# Patient Record
Sex: FEMALE | Race: WHITE | NOT HISPANIC OR LATINO | URBAN - METROPOLITAN AREA
[De-identification: names, ages, dates, MRNs, and addresses within clinical notes are randomized per-mention and may not be internally consistent; named-entity substitution may affect disease eponyms.]

---

## 2021-06-10 ENCOUNTER — HOSPITAL ENCOUNTER (EMERGENCY)
Facility: HOSPITAL | Age: 51
Discharge: HOME | End: 2021-06-10
Attending: EMERGENCY MEDICINE | Admitting: EMERGENCY MEDICINE

## 2021-06-10 VITALS
OXYGEN SATURATION: 97 % | DIASTOLIC BLOOD PRESSURE: 80 MMHG | RESPIRATION RATE: 17 BRPM | HEART RATE: 91 BPM | TEMPERATURE: 98.4 F | WEIGHT: 149.25 LBS | SYSTOLIC BLOOD PRESSURE: 126 MMHG

## 2021-06-10 DIAGNOSIS — S09.90XA INJURY OF HEAD, INITIAL ENCOUNTER: Primary | ICD-10-CM

## 2021-06-10 DIAGNOSIS — S00.93XA HEAD CONTUSION: ICD-10-CM

## 2021-06-10 DIAGNOSIS — B86 SCABIES: ICD-10-CM

## 2021-06-10 PROCEDURE — 90471 IMMUNIZATION ADMIN: CPT

## 2021-06-10 PROCEDURE — 90715 TDAP VACCINE 7 YRS/> IM: CPT | Performed by: PHYSICIAN ASSISTANT

## 2021-06-10 PROCEDURE — 99282 EMERGENCY DEPT VISIT SF MDM: CPT | Performed by: PHYSICIAN ASSISTANT

## 2021-06-10 PROCEDURE — 99283 EMERGENCY DEPT VISIT LOW MDM: CPT

## 2021-06-10 RX ORDER — PERMETHRIN 50 MG/G
CREAM TOPICAL
Qty: 60 G | Refills: 0 | Status: SHIPPED | OUTPATIENT
Start: 2021-06-10

## 2021-06-10 RX ORDER — BACITRACIN, NEOMYCIN, POLYMYXIN B 400; 3.5; 5 [USP'U]/G; MG/G; [USP'U]/G
1 OINTMENT TOPICAL ONCE
Status: COMPLETED | OUTPATIENT
Start: 2021-06-10 | End: 2021-06-10

## 2021-06-10 RX ADMIN — BACITRACIN ZINC, NEOMYCIN, POLYMYXIN B SULFAT 1 SMALL APPLICATION: 5000; 3.5; 4 OINTMENT TOPICAL at 15:18

## 2021-06-10 RX ADMIN — TETANUS TOXOID, REDUCED DIPHTHERIA TOXOID AND ACELLULAR PERTUSSIS VACCINE, ADSORBED 0.5 ML: 5; 2.5; 8; 8; 2.5 SUSPENSION INTRAMUSCULAR at 15:23

## 2021-06-10 NOTE — ED PROVIDER NOTES
History  Chief Complaint   Patient presents with    Fall     pt fell yesterday and hit L side of head wound noted, pt concerned for infection  also reports bug bites all over body  Patient is a 44 y/o female presenting to the ED for evaluation of head injury and rash  Pt states yesterday she tripped and fell, hitting the right frontal area of her head, sustaining abrasion  Pt denies LOC  Pt denies alcohol use or blood thinner use  Pt cleaned abrasion, but wants to make sure it is not infected  Unknown last tetanus  Patient also reports bug bite rash all over her body for past week, states occurred after washing her dog, but the dog does not have flees  Pt worried about possible scabies  Pt denies fever, new medication, new food, new soap/detergent  None       No past medical history on file  No past surgical history on file  No family history on file  I have reviewed and agree with the history as documented  E-Cigarette/Vaping     E-Cigarette/Vaping Substances     Social History     Tobacco Use    Smoking status: Former Smoker    Smokeless tobacco: Never Used   Substance Use Topics    Alcohol use: Yes     Frequency: 2-4 times a month    Drug use: Never       Review of Systems   Skin: Positive for rash and wound  All other systems reviewed and are negative  Physical Exam  Physical Exam  Constitutional:       Appearance: Normal appearance  HENT:      Head: Normocephalic  Abrasion present  Right Ear: External ear normal       Left Ear: External ear normal       Nose: Nose normal       Mouth/Throat:      Lips: Pink  Mouth: Mucous membranes are moist    Eyes:      Extraocular Movements: Extraocular movements intact  Conjunctiva/sclera: Conjunctivae normal    Neck:      Musculoskeletal: Normal range of motion and neck supple  Pulmonary:      Effort: No tachypnea or respiratory distress  Skin:     General: Skin is warm        Capillary Refill: Capillary refill takes less than 2 seconds  Findings: Rash (scattered lesions consistent with scabies) present  Neurological:      Mental Status: She is alert and oriented to person, place, and time  GCS: GCS eye subscore is 4  GCS verbal subscore is 5  GCS motor subscore is 6  Psychiatric:         Mood and Affect: Mood and affect normal          Speech: Speech normal          Vital Signs  ED Triage Vitals [06/10/21 1429]   Temperature Pulse Respirations Blood Pressure SpO2   98 4 °F (36 9 °C) 91 17 126/80 97 %      Temp Source Heart Rate Source Patient Position - Orthostatic VS BP Location FiO2 (%)   Oral Monitor Sitting Right arm --      Pain Score       --           Vitals:    06/10/21 1429   BP: 126/80   Pulse: 91   Patient Position - Orthostatic VS: Sitting         Visual Acuity  Visual Acuity      Most Recent Value   L Pupil Size (mm)  4   R Pupil Size (mm)  4          ED Medications  Medications   neomycin-bacitracin-polymyxin b (NEOSPORIN) ointment 1 small application (1 small application Topical Given 6/10/21 1518)   tetanus-diphtheria-acellular pertussis (BOOSTRIX) IM injection 0 5 mL (0 5 mL Intramuscular Given 6/10/21 1523)       Diagnostic Studies  Results Reviewed     None                 No orders to display              Procedures  Procedures         ED Course                             SBIRT 22yo+      Most Recent Value   SBIRT (22 yo +)   In order to provide better care to our patients, we are screening all of our patients for alcohol and drug use  Would it be okay to ask you these screening questions? No Filed at: 06/10/2021 1443                    MDM  Number of Diagnoses or Management Options  Head contusion: new and does not require workup  Injury of head, initial encounter: new and does not require workup  Scabies: new and does not require workup  Diagnosis management comments: Patient is a 46 y/o female presenting to the ED for evaluation of head injury and rash   Pt states yesterday she tripped and fell, hitting the right frontal area of her head, sustaining abrasion  Pt denies LOC  Pt denies alcohol use or blood thinner use  Pt cleaned abrasion, but wants to make sure it is not infected  Unknown last tetanus  Patient also reports bug bite rash all over her body for past week, states occurred after washing her dog, but the dog does not have flees  Pt worried about possible scabies  Rash consistent with scabies vs bed bugs  Rx for permetherin cream provided  Tetanus updated  Head injury w/o LOC, abrasion to left forehead, no evidence of infection  Wound cleaned and neosporin applied, advised to keep area clean  Signs and symptoms of infection discussed with patient and reasons to return to the ED  Patient verbalizes understanding and agrees with plan  The management plan was discussed in detail with the patient at bedside and all questions were answered  Prior to discharge, I provided both verbal and written instructions  I discussed with the patient the signs and symptoms for which to return to the emergency department  All questions were answered and patient was comfortable with the plan of care and discharged to home  The patient agrees to return to the Emergency Department for concerns and/or progression of illness  Disposition  Final diagnoses:   Injury of head, initial encounter   Head contusion   Scabies     Time reflects when diagnosis was documented in both MDM as applicable and the Disposition within this note     Time User Action Codes Description Comment    6/10/2021  3:00 PM Azalia Scott Add [S09 [id number]] Injury of head, initial encounter     6/10/2021  3:00 PM Azalia Scott Add [S00 [id number]] Head contusion     6/10/2021  3:00 PM Rick Muro S  [facility] Scabies       ED Disposition     ED Disposition Condition Date/Time Comment    Discharge Stable Thu Fritz 10, 2021  3:21 PM Emily Lincoln discharge to home/self care              Follow-up Information     Follow up With Specialties Details Why Contact Info Additional Information    [address] [facility] Emergency Medicine Go to  If symptoms worsen [place] [id number]  [address] [facility], [address]  , [place], [place], [address]          Patient's Medications   Discharge Prescriptions    PERMETHRIN (ELIMITE) 5 % CREAM    Apply once from neck to soles of feet, leave on for 12 hours, then wash off  Start Date: 6/10/2021 End Date: --       Order Dose: --       Quantity: 60 g    Refills: 0     No discharge procedures on file      PDMP Review     None          ED Provider  Electronically Signed by           Madeleine Strange PA-C  06/10/21 6632